# Patient Record
Sex: MALE | Race: WHITE | Employment: FULL TIME | URBAN - METROPOLITAN AREA
[De-identification: names, ages, dates, MRNs, and addresses within clinical notes are randomized per-mention and may not be internally consistent; named-entity substitution may affect disease eponyms.]

---

## 2019-04-11 ENCOUNTER — APPOINTMENT (OUTPATIENT)
Dept: GENERAL RADIOLOGY | Age: 29
End: 2019-04-11
Attending: EMERGENCY MEDICINE
Payer: COMMERCIAL

## 2019-04-11 ENCOUNTER — HOSPITAL ENCOUNTER (EMERGENCY)
Age: 29
Discharge: HOME | End: 2019-04-12
Attending: EMERGENCY MEDICINE
Payer: COMMERCIAL

## 2019-04-11 VITALS
TEMPERATURE: 98.5 F | DIASTOLIC BLOOD PRESSURE: 67 MMHG | HEIGHT: 74 IN | SYSTOLIC BLOOD PRESSURE: 139 MMHG | WEIGHT: 300 LBS | BODY MASS INDEX: 38.5 KG/M2 | OXYGEN SATURATION: 98 % | RESPIRATION RATE: 22 BRPM

## 2019-04-11 DIAGNOSIS — S59.902A ELBOW INJURY, LEFT, INITIAL ENCOUNTER: Primary | ICD-10-CM
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
## 2019-04-11 PROCEDURE — 96372 THER/PROPH/DIAG INJ SC/IM: CPT

## 2019-04-11 PROCEDURE — 74011250637 HC RX REV CODE- 250/637: Performed by: EMERGENCY MEDICINE

## 2019-04-11 PROCEDURE — 73080 X-RAY EXAM OF ELBOW: CPT

## 2019-04-11 PROCEDURE — 99283 EMERGENCY DEPT VISIT LOW MDM: CPT

## 2019-04-11 PROCEDURE — 74011250636 HC RX REV CODE- 250/636: Performed by: NURSE PRACTITIONER

## 2019-04-11 PROCEDURE — L3650 SO 8 ABD RESTRAINT PRE OTS: HCPCS

## 2019-04-11 PROCEDURE — 75810000053 HC SPLINT APPLICATION

## 2019-04-11 RX ORDER — IBUPROFEN 600 MG/1
600 TABLET ORAL
Qty: 30 TAB | Refills: 0 | Status: SHIPPED | OUTPATIENT
Start: 2019-04-11

## 2019-04-11 RX ORDER — OXYCODONE AND ACETAMINOPHEN 10; 325 MG/1; MG/1
1 TABLET ORAL
Qty: 12 TAB | Refills: 0 | Status: SHIPPED | OUTPATIENT
Start: 2019-04-11 | End: 2019-04-14

## 2019-04-11 RX ORDER — ONDANSETRON 4 MG/1
4 TABLET, ORALLY DISINTEGRATING ORAL
Status: COMPLETED | OUTPATIENT
Start: 2019-04-11 | End: 2019-04-11

## 2019-04-11 RX ORDER — ONDANSETRON 4 MG/1
4 TABLET, ORALLY DISINTEGRATING ORAL
Qty: 20 TAB | Refills: 0 | Status: SHIPPED | OUTPATIENT
Start: 2019-04-11

## 2019-04-11 RX ORDER — OXYCODONE AND ACETAMINOPHEN 10; 325 MG/1; MG/1
1 TABLET ORAL
Status: COMPLETED | OUTPATIENT
Start: 2019-04-11 | End: 2019-04-11

## 2019-04-11 RX ORDER — KETOROLAC TROMETHAMINE 30 MG/ML
30 INJECTION, SOLUTION INTRAMUSCULAR; INTRAVENOUS
Status: COMPLETED | OUTPATIENT
Start: 2019-04-11 | End: 2019-04-11

## 2019-04-11 RX ADMIN — OXYCODONE HYDROCHLORIDE AND ACETAMINOPHEN 1 TABLET: 10; 325 TABLET ORAL at 22:28

## 2019-04-11 RX ADMIN — ONDANSETRON 4 MG: 4 TABLET, ORALLY DISINTEGRATING ORAL at 22:28

## 2019-04-11 RX ADMIN — KETOROLAC TROMETHAMINE 30 MG: 30 INJECTION, SOLUTION INTRAMUSCULAR at 23:39

## 2019-04-11 NOTE — LETTER
[address] [place] [address] Dr 
[facility] EMERGENCY DEPT 
[address] [place] [address] Aristides Bridges [address] [id number] 
[phone] Work/School Note Date: 4/11/2019 To Whom It May concern: 
 
Luke Reynoso was seen and treated today in the emergency room by the following provider(s): 
Attending Provider: Edinson Barrios MD 
Nurse Practitioner: Patricio Casey NP. Luke Reynoso may return to work on 4/16/19. Sincerely, Verna Conway RN

## 2019-04-12 NOTE — ED PROVIDER NOTES
Patient is a 62-year-old male without significant past medical history he was ambulatory to the ED today with complaints of left elbow pain. Patient states he was playing softball and got him back into a base when he hyperextended his left arm. Patient states he noted that the burns look like they were out of place and then popped back in. Patient still in significant amount of pain. Patient states he is tingling to his left hand. Patient denies prior trauma to that arm. Patient's no further complaints at this time. Primary care Provider: None The history is provided by the patient and the spouse. No  was used. No past medical history on file. No past surgical history on file. No family history on file. Social History Socioeconomic History  Marital status: SINGLE Spouse name: Not on file  Number of children: Not on file  Years of education: Not on file  Highest education level: Not on file Occupational History  Not on file Social Needs  Financial resource strain: Not on file  Food insecurity:  
  Worry: Not on file Inability: Not on file  Transportation needs:  
  Medical: Not on file Non-medical: Not on file Tobacco Use  Smoking status: Not on file Substance and Sexual Activity  Alcohol use: Not on file  Drug use: Not on file  Sexual activity: Not on file Lifestyle  Physical activity:  
  Days per week: Not on file Minutes per session: Not on file  Stress: Not on file Relationships  Social connections:  
  Talks on phone: Not on file Gets together: Not on file Attends Congregation service: Not on file Active member of club or organization: Not on file Attends meetings of clubs or organizations: Not on file Relationship status: Not on file  Intimate partner violence:  
  Fear of current or ex partner: Not on file Emotionally abused: Not on file Physically abused: Not on file Forced sexual activity: Not on file Other Topics Concern  Not on file Social History Narrative  Not on file ALLERGIES: Patient has no known allergies. Review of Systems Gastrointestinal: Positive for nausea. Musculoskeletal: Positive for arthralgias, joint swelling and myalgias. All other systems reviewed and are negative. Vitals:  
 04/11/19 2201 BP: 139/67 Resp: 22 Temp: 98.5 °F (36.9 °C) SpO2: 98% Weight: 136.1 kg (300 lb) Height: 6' 2\" (1.88 m) Physical Exam  
Constitutional: He appears well-developed and well-nourished. HENT:  
Head: Atraumatic. Eyes: EOM are normal.  
Neck: No tracheal deviation present. Pulmonary/Chest: Effort normal. No respiratory distress. Musculoskeletal:  
     Left elbow: He exhibits decreased range of motion and swelling. He exhibits no effusion, no deformity and no laceration. Tenderness found. Medial epicondyle tenderness noted. No lateral epicondyle and no olecranon process tenderness noted. Difficulty with finger/thumb opposition. Pain radiates up the the forearm area. Unable to flex or extend the arm. Neurological: He is alert. Skin: Skin is warm and dry. Psychiatric: He has a normal mood and affect. His behavior is normal. Judgment and thought content normal.  
Nursing note and vitals reviewed. MDM Procedures Assessment & Plan:  
 
Orders Placed This Encounter  XR ELBOW LT MIN 3 V  
 XR FOREARM LEFT  ondansetron (ZOFRAN ODT) tablet 4 mg  oxyCODONE-acetaminophen (PERCOCET 10)  mg per tablet 1 Tab Discussed with Quinton Rivera,ED Provider Karina Gilbert NP 
04/11/19 
10:20 PM 
 
No fracture. Due to pain, will place in posterior long arm splint and sling. Follow-up with Orthopedics. Percocet and Ibuprofen for pain. Discussed return precautions. 10:55 PM 
The patient has been reevaluated. The patient is ready for discharge.  The patient's signs, symptoms, diagnosis, and discharge instructions have been discussed and the patient/ family has conveyed their understanding. The patient is to follow up as recommended or return to the ED should their symptoms worsen. Plan has been discussed and the patient is in agreement. LABORATORY TESTS: 
Labs Reviewed - No data to display IMAGING RESULTS: 
Xr Elbow Lt Min 3 V Result Date: 4/11/2019 EXAM: XR ELBOW LT MIN 3 V INDICATION: Status post fall with left elbow pain. COMPARISON: None. FINDINGS: Three views of the left elbow demonstrate no fracture, dislocation, effusion or other acute abnormality. IMPRESSION: No acute abnormality. MEDICATIONS GIVEN: 
Medications  
ondansetron (ZOFRAN ODT) tablet 4 mg (4 mg Oral Given 4/11/19 2228) oxyCODONE-acetaminophen (PERCOCET 10)  mg per tablet 1 Tab (1 Tab Oral Given 4/11/19 2228) IMPRESSION: 
1. Elbow injury, left, initial encounter 2. Injury while playing softball PLAN: 
1. Current Discharge Medication List  
  
START taking these medications Details  
oxyCODONE-acetaminophen (PERCOCET 10)  mg per tablet Take 1 Tab by mouth every six (6) hours as needed for Pain for up to 3 days. Max Daily Amount: 4 Tabs. Indications: Pain 
Qty: 12 Tab, Refills: 0 Associated Diagnoses: Elbow injury, left, initial encounter  
  
ibuprofen (MOTRIN) 600 mg tablet Take 1 Tab by mouth every six (6) hours as needed for Pain. Qty: 30 Tab, Refills: 0  
  
ondansetron (ZOFRAN ODT) 4 mg disintegrating tablet Take 1 Tab by mouth every eight (8) hours as needed for Nausea. Qty: 20 Tab, Refills: 0  
  
  
 
2. Follow-up Information Follow up With Specialties Details Why Contact Info [address] [place] [address]  Schedule an appointment as soon as possible for a visit  [address] [place] [address] [address] [place] [id number] [phone] [address]  Schedule an appointment as soon as possible for a visit Primary care referral Otoniel Denton [address] 
[phone] [facility] EMERGENCY DEPT Emergency Medicine  As needed, If symptoms worsen [address] 
[phone] 3. Return to ED for new or worsening symptoms Kristofer Rafael, NP

## 2019-04-12 NOTE — DISCHARGE INSTRUCTIONS
Thank you for allowing us to care for you today. Please follow-up with your Primary Care provider in the next 2-3 days if your symptoms do not improve. Plan for home:     Keep your arm/hand in a splint. Keep the splint clean and dry. Do not remove the splint. You may use a sling for support of the hand. If you use a sling, remember to take your arm out of the sling several times a day and rotate the shoulder to prevent a frozen shoulder. No Fracture or dislocation was seen. Sometimes fractures take a few days to show when they are hairline. If you have continued pain you need to follow-up with [address] [place] [address] for repeat films. Percocet 10/325 mg every 6-8 hours as needed for pain. No driving while on this medication. Do not mix with alcohol. You have been given a prescription for a small supply of narcotic pain medication. Narcotic pain medication is highly addictive. Please keep this medication in a safe place. You should only use this medication when in extreme pain. You should not drive or operate heavy machinery for 24 hours after taking this medication. This medication also has Tylenol in it. You should not take any additional Tylenol while on this medication that exceeds 3,000mg in a 24 hour period. Tylenol 1000 mg alternating with Ibuprofen 600mg every 6 hours for pain control. Example: 8am take Ibuprofen. 11am take tylenol. 2pm take ibuprofen. 5pm take tylenol. 8pm take ibuprofen. 11pm take tylenol. You may continue this process overnight if you have continued pain/discomfort. Come back to the ER if you have worsening symptoms, fevers over 100.9, shaking chills, nausea or vomiting. Patient Education        Elbow: Exercises  Your Care Instructions  Here are some examples of exercises for elbows. Start each exercise slowly. Ease off the exercise if you start to have pain.   Your doctor or physical therapist will tell you when you can start these exercises and which ones will work best for you. How to do the exercises  Wrist flexor stretch    1. Extend your arm in front of you with your palm up. 2. Bend your wrist, pointing your hand toward the floor. 3. With your other hand, gently bend your wrist farther until you feel a mild to moderate stretch in your forearm. 4. Hold for at least 15 to 30 seconds. Repeat 2 to 4 times. Wrist extensor stretch    1. Repeat steps 1 to 4 of the stretch above but begin with your extended hand palm down. Ball or sock squeeze    1. Hold a tennis ball (or a rolled-up sock) in your hand. 2. Make a fist around the ball (or sock) and squeeze. 3. Hold for about 6 seconds, and then relax for up to 10 seconds. 4. Repeat 8 to 12 times. 5. Switch the ball (or sock) to your other hand and do 8 to 12 times. Wrist deviation    1. Sit so that your arm is supported but your hand hangs off the edge of a flat surface, such as a table. 2. Hold your hand out like you are shaking hands with someone. 3. Move your hand up and down. 4. Repeat this motion 8 to 12 times. 5. Switch arms. 6. Try to do this exercise twice with each hand. Wrist curls    1. Place your forearm on a table with your hand hanging over the edge of the table, palm up. 2. Place a 1- to 2-pound weight in your hand. This may be a dumbbell, a can of food, or a filled water bottle. 3. Slowly raise and lower the weight while keeping your forearm on the table and your palm facing up. 4. Repeat this motion 8 to 12 times. 5. Switch arms, and do steps 1 through 4.  6. Repeat with your hand facing down toward the floor. Switch arms. Biceps curls    1. Sit leaning forward with your legs slightly spread and your left hand on your left thigh. 2. Place your right elbow on your right thigh, and hold the weight with your forearm horizontal.  3. Slowly curl the weight up and toward your chest.  4. Repeat this motion 8 to 12 times. 5. Switch arms, and do steps 1 through 4.     Follow-up care is a key part of your treatment and safety. Be sure to make and go to all appointments, and call your doctor if you are having problems. It's also a good idea to know your test results and keep a list of the medicines you take. Where can you learn more? Go to http://isabella-andrey.info/. Enter M279 in the search box to learn more about \"Elbow: Exercises. \"  Current as of: September 20, 2018  Content Version: 11.9  © 6077-6270 [employer]. Care instructions adapted under license by [employer] (which disclaims liability or warranty for this information). If you have questions about a medical condition or this instruction, always ask your healthcare professional. [address] any warranty or liability for your use of this information.

## 2019-04-12 NOTE — ED NOTES
Verbal shift change report given to Jakub Miranda (oncoming nurse) by Fabienne Daniel (offgoing nurse). Report included the following information SBAR, Kardex, ED Summary and MAR.

## 2019-04-12 NOTE — ED TRIAGE NOTES
Patient was playing softball and dove, injuring his left elbow. Thinks that it is out of place. Has never had a dislocation in the elbow before. Occurred about an hour ago.